# Patient Record
Sex: FEMALE | Race: WHITE | NOT HISPANIC OR LATINO | Employment: OTHER | URBAN - METROPOLITAN AREA
[De-identification: names, ages, dates, MRNs, and addresses within clinical notes are randomized per-mention and may not be internally consistent; named-entity substitution may affect disease eponyms.]

---

## 2023-10-05 ENCOUNTER — OFFICE VISIT (OUTPATIENT)
Dept: POST ACUTE CARE | Facility: EXTERNAL LOCATION | Age: 43
End: 2023-10-05
Payer: MEDICARE

## 2023-10-05 DIAGNOSIS — G80.0 CP (CEREBRAL PALSY), SPASTIC, QUADRIPLEGIC (MULTI): Primary | ICD-10-CM

## 2023-10-05 PROCEDURE — 99308 SBSQ NF CARE LOW MDM 20: CPT | Performed by: ORTHOPAEDIC SURGERY

## 2023-10-05 NOTE — PROGRESS NOTES
Kelsea is a 43 year old female with a history of spastic quadriplegia, scoliosis, bilateral hip dislocations who presents today for evaluation. She uses a new bean bag positioner which has helped her maintain her upright. She is not experiencing pain at this time and looks pretty good in her chair.  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  She also recently had a forearm fracture.  As of now, she is doing well and does not have any significant orthopaedic issues.    No past medical history on file.  No past surgical history on file.  No current outpatient medications on file.  Not on File  No family history on file.  Social History     Socioeconomic History    Marital status: Single     Spouse name: Not on file    Number of children: Not on file    Years of education: Not on file    Highest education level: Not on file   Occupational History    Not on file   Tobacco Use    Smoking status: Not on file    Smokeless tobacco: Not on file   Substance and Sexual Activity    Alcohol use: Not on file    Drug use: Not on file    Sexual activity: Not on file   Other Topics Concern    Not on file   Social History Narrative    Not on file     Social Determinants of Health     Financial Resource Strain: Not on file   Food Insecurity: Not on file   Transportation Needs: Not on file   Physical Activity: Not on file   Stress: Not on file   Social Connections: Not on file   Intimate Partner Violence: Not on file   Housing Stability: Not on file       Review of Systems: 16 systems reviewed with the patient and family.  All systems were negative except for the pertinent positives noted in the history above.    Physical Exam:  Gen: Microcephalic, in wheelchair, NAD  General: Well developed, well nourished female in no acute distress.  Skin: The skin is intact with no evidence of abrasions, bruises, or swelling.  Vascular: There are 2+ pulses in both upper extremities and brisk capillary  refill.  Neuro: The light touch sensation is grossly intact in both arms. The AIN/PIN/Ulnar nerve function is intact.    MSK: Increased tone in bilateral upper extremities. LUE locked in extension, RUE more mobile. R wrist and forearm with full ROM. Marked L lumbar scoliosis present with L ribs no longer in contact with the ilium secondary to position blocks.     Assesment: Kelsea Guzman is a 43 y.o. female who is doing well following a fracture last year.  She is tight everywhere and has a significant curve in her back.  She might benefit from some additional tone meds, but otherwise has no orthopaedic needs.  I will see has again in 5 years with no Xrays unless she has pain or another issue.

## 2023-10-24 PROBLEM — H16.323: Status: ACTIVE | Noted: 2023-10-24

## 2023-10-24 PROBLEM — G82.50 SPASTIC QUADRIPLEGIA (MULTI): Status: ACTIVE | Noted: 2023-10-24

## 2023-10-24 PROBLEM — S52.209A CLOSED FRACTURE OF SHAFT OF ULNA: Status: ACTIVE | Noted: 2023-10-24

## 2023-10-24 PROBLEM — Z86.69 HISTORY OF KERATITIS: Status: ACTIVE | Noted: 2023-10-24

## 2023-10-24 PROBLEM — S52.231D: Status: ACTIVE | Noted: 2023-10-24

## 2023-10-24 PROBLEM — H17.9 BILATERAL CORNEA SCARS: Status: ACTIVE | Noted: 2023-10-24

## 2023-10-24 PROBLEM — M41.45 NEUROMUSCULAR SCOLIOSIS OF THORACOLUMBAR REGION: Status: ACTIVE | Noted: 2023-10-24

## 2023-10-24 PROBLEM — R22.31 MASS OF RIGHT FOREARM: Status: ACTIVE | Noted: 2023-10-24

## 2023-10-24 RX ORDER — ALBUTEROL SULFATE 90 UG/1
AEROSOL, METERED RESPIRATORY (INHALATION)
COMMUNITY

## 2023-10-24 RX ORDER — MUPIROCIN 20 MG/G
OINTMENT TOPICAL
COMMUNITY
Start: 2019-06-14

## 2023-10-24 RX ORDER — OMEPRAZOLE 20 MG/1
CAPSULE, DELAYED RELEASE ORAL
COMMUNITY
Start: 2023-09-30

## 2023-10-24 RX ORDER — HYPROMELLOSE 0 G/G
GEL OPHTHALMIC
COMMUNITY

## 2023-10-24 RX ORDER — DOXYCYCLINE HYCLATE 100 MG
TABLET ORAL
COMMUNITY
Start: 2023-05-13

## 2023-10-24 RX ORDER — ACETAMINOPHEN 160 MG/5ML
SUSPENSION ORAL
COMMUNITY

## 2023-10-24 RX ORDER — DOXYLAMINE SUCCINATE 25 MG
TABLET ORAL
COMMUNITY

## 2023-10-24 RX ORDER — BISACODYL 5 MG
TABLET, DELAYED RELEASE (ENTERIC COATED) ORAL ONCE
COMMUNITY

## 2023-10-24 RX ORDER — NYSTATIN 100000 U/G
CREAM TOPICAL
COMMUNITY
Start: 2023-05-26

## 2023-10-24 RX ORDER — ACETAMINOPHEN 500 MG
TABLET ORAL
COMMUNITY

## 2023-10-24 RX ORDER — MELATONIN 100 %
POWDER (GRAM) MISCELLANEOUS
COMMUNITY

## 2023-10-24 RX ORDER — CLOTRIMAZOLE 1 %
CREAM (GRAM) TOPICAL
COMMUNITY
Start: 2023-08-25

## 2023-10-24 RX ORDER — BISACODYL 10 MG/1
SUPPOSITORY RECTAL
COMMUNITY

## 2023-10-24 RX ORDER — OSELTAMIVIR PHOSPHATE 30 MG/1
CAPSULE ORAL
COMMUNITY

## 2023-10-24 RX ORDER — MULTIVITAMIN
TABLET ORAL
COMMUNITY

## 2023-10-24 RX ORDER — MULTIVITAMIN/IRON/FOLIC ACID 18MG-0.4MG
TABLET ORAL
COMMUNITY
Start: 2023-08-29

## 2023-10-24 RX ORDER — ALBUTEROL SULFATE 0.63 MG/3ML
SOLUTION RESPIRATORY (INHALATION)
COMMUNITY

## 2023-10-24 RX ORDER — POLYETHYLENE GLYCOL 3350 17 G/17G
POWDER, FOR SOLUTION ORAL
COMMUNITY

## 2023-10-24 RX ORDER — MULTIVIT,CALC,MINS/IRON/FOLIC 9MG-400MCG
TABLET ORAL
COMMUNITY
Start: 2022-11-02

## 2023-10-24 RX ORDER — LANOLIN ALCOHOL/MO/W.PET/CERES
CREAM (GRAM) TOPICAL
COMMUNITY
Start: 2023-09-18

## 2023-10-24 RX ORDER — ACETAMINOPHEN 325 MG/1
TABLET ORAL
COMMUNITY

## 2023-10-24 RX ORDER — NAPROXEN 250 MG/1
TABLET ORAL
COMMUNITY

## 2023-10-24 RX ORDER — SALINE NASAL SPRAY 1.5 OZ
SOLUTION NASAL
COMMUNITY
Start: 2023-03-23

## 2023-10-24 ASSESSMENT — CUP TO DISC RATIO
OS_RATIO: 0.3
OD_RATIO: 0.3

## 2023-10-24 ASSESSMENT — EXTERNAL EXAM - LEFT EYE: OS_EXAM: NORMAL

## 2023-10-24 ASSESSMENT — EXTERNAL EXAM - RIGHT EYE: OD_EXAM: NORMAL

## 2023-10-24 NOTE — PROGRESS NOTES
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 No active inflammation or epithelial defects seen on exam today.  -Per caretakers, patient without redness, not rubbing eyes, does not seem bothered by vision/eyes. Had a couple of episodes of mucus appearing substance on cornea about 2019, but no redness or pain. Seemed to go away on it`s own. No reported issues currently. Advised to call if symptoms worsen.  -Continue artificial tears OU QID and lubricating ointment (genteal gel) QHS. F/u 1 year, but advised to return sooner if any redness, pain, change in vision.

## 2023-10-25 ENCOUNTER — OFFICE VISIT (OUTPATIENT)
Dept: OPHTHALMOLOGY | Facility: CLINIC | Age: 43
End: 2023-10-25
Payer: MEDICARE

## 2023-10-25 DIAGNOSIS — Z86.69 HISTORY OF KERATITIS: ICD-10-CM

## 2023-10-25 DIAGNOSIS — H17.9 CORNEAL SCARS, BOTH EYES: Primary | ICD-10-CM

## 2023-10-25 DIAGNOSIS — G82.50 SPASTIC QUADRIPLEGIA (MULTI): ICD-10-CM

## 2023-10-25 PROCEDURE — 92014 COMPRE OPH EXAM EST PT 1/>: CPT | Performed by: OPHTHALMOLOGY

## 2023-10-25 ASSESSMENT — VISUAL ACUITY: METHOD: SNELLEN - LINEAR

## 2023-10-25 ASSESSMENT — ENCOUNTER SYMPTOMS: EYES NEGATIVE: 1

## 2023-10-25 ASSESSMENT — TONOMETRY
OD_IOP_MMHG: 19
OS_IOP_MMHG: 21
IOP_METHOD: TONOPEN

## 2024-04-03 ENCOUNTER — LAB REQUISITION (OUTPATIENT)
Dept: LAB | Facility: HOSPITAL | Age: 44
End: 2024-04-03
Payer: MEDICARE

## 2024-04-03 DIAGNOSIS — E55.9 VITAMIN D DEFICIENCY, UNSPECIFIED: ICD-10-CM

## 2024-04-03 DIAGNOSIS — D64.9 ANEMIA, UNSPECIFIED: ICD-10-CM

## 2024-04-03 DIAGNOSIS — E78.5 HYPERLIPIDEMIA, UNSPECIFIED: ICD-10-CM

## 2024-04-03 DIAGNOSIS — G80.0 SPASTIC QUADRIPLEGIC CEREBRAL PALSY (MULTI): ICD-10-CM

## 2024-04-03 DIAGNOSIS — K21.9 GASTRO-ESOPHAGEAL REFLUX DISEASE WITHOUT ESOPHAGITIS: ICD-10-CM

## 2024-04-03 DIAGNOSIS — Z79.899 OTHER LONG TERM (CURRENT) DRUG THERAPY: ICD-10-CM

## 2024-04-03 LAB
25(OH)D3 SERPL-MCNC: 43 NG/ML (ref 30–100)
ALBUMIN SERPL BCP-MCNC: 4.1 G/DL (ref 3.4–5)
ALP SERPL-CCNC: 66 U/L (ref 33–110)
ALT SERPL W P-5'-P-CCNC: 20 U/L (ref 7–45)
ANION GAP SERPL CALC-SCNC: 11 MMOL/L (ref 10–20)
AST SERPL W P-5'-P-CCNC: 17 U/L (ref 9–39)
BASOPHILS # BLD AUTO: 0.08 X10*3/UL (ref 0–0.1)
BASOPHILS NFR BLD AUTO: 1.2 %
BILIRUB SERPL-MCNC: 0.2 MG/DL (ref 0–1.2)
BUN SERPL-MCNC: 12 MG/DL (ref 6–23)
CALCIUM SERPL-MCNC: 9 MG/DL (ref 8.6–10.3)
CHLORIDE SERPL-SCNC: 105 MMOL/L (ref 98–107)
CHOLEST SERPL-MCNC: 180 MG/DL (ref 0–199)
CHOLESTEROL/HDL RATIO: 4.3
CO2 SERPL-SCNC: 29 MMOL/L (ref 21–32)
CREAT SERPL-MCNC: 0.47 MG/DL (ref 0.5–1.05)
CRP SERPL-MCNC: 0.38 MG/DL
EGFRCR SERPLBLD CKD-EPI 2021: >90 ML/MIN/1.73M*2
EOSINOPHIL # BLD AUTO: 0.16 X10*3/UL (ref 0–0.7)
EOSINOPHIL NFR BLD AUTO: 2.3 %
ERYTHROCYTE [DISTWIDTH] IN BLOOD BY AUTOMATED COUNT: 13.3 % (ref 11.5–14.5)
FERRITIN SERPL-MCNC: 41 NG/ML (ref 8–150)
GLUCOSE SERPL-MCNC: 77 MG/DL (ref 74–99)
HCT VFR BLD AUTO: 40.6 % (ref 36–46)
HDLC SERPL-MCNC: 41.9 MG/DL
HGB BLD-MCNC: 12.6 G/DL (ref 12–16)
IMM GRANULOCYTES # BLD AUTO: 0.02 X10*3/UL (ref 0–0.7)
IMM GRANULOCYTES NFR BLD AUTO: 0.3 % (ref 0–0.9)
IRON SATN MFR SERPL: 17 % (ref 25–45)
IRON SERPL-MCNC: 56 UG/DL (ref 35–150)
LDLC SERPL CALC-MCNC: 102 MG/DL
LYMPHOCYTES # BLD AUTO: 2.03 X10*3/UL (ref 1.2–4.8)
LYMPHOCYTES NFR BLD AUTO: 29.8 %
MAGNESIUM SERPL-MCNC: 2.11 MG/DL (ref 1.6–2.4)
MCH RBC QN AUTO: 28.3 PG (ref 26–34)
MCHC RBC AUTO-ENTMCNC: 31 G/DL (ref 32–36)
MCV RBC AUTO: 91 FL (ref 80–100)
MONOCYTES # BLD AUTO: 0.39 X10*3/UL (ref 0.1–1)
MONOCYTES NFR BLD AUTO: 5.7 %
NEUTROPHILS # BLD AUTO: 4.13 X10*3/UL (ref 1.2–7.7)
NEUTROPHILS NFR BLD AUTO: 60.7 %
NON HDL CHOLESTEROL: 138 MG/DL (ref 0–149)
NRBC BLD-RTO: 0 /100 WBCS (ref 0–0)
PHOSPHATE SERPL-MCNC: 4.1 MG/DL (ref 2.5–4.9)
PLATELET # BLD AUTO: 424 X10*3/UL (ref 150–450)
POTASSIUM SERPL-SCNC: 4.9 MMOL/L (ref 3.5–5.3)
PROT SERPL-MCNC: 6.8 G/DL (ref 6.4–8.2)
RBC # BLD AUTO: 4.46 X10*6/UL (ref 4–5.2)
SODIUM SERPL-SCNC: 140 MMOL/L (ref 136–145)
TIBC SERPL-MCNC: 331 UG/DL (ref 240–445)
TRANSFERRIN SERPL-MCNC: 239 MG/DL (ref 200–360)
TRIGL SERPL-MCNC: 182 MG/DL (ref 0–149)
UIBC SERPL-MCNC: 275 UG/DL (ref 110–370)
VLDL: 36 MG/DL (ref 0–40)
WBC # BLD AUTO: 6.8 X10*3/UL (ref 4.4–11.3)

## 2024-04-03 PROCEDURE — 85025 COMPLETE CBC W/AUTO DIFF WBC: CPT | Mod: OUT | Performed by: PEDIATRICS

## 2024-04-03 PROCEDURE — 84100 ASSAY OF PHOSPHORUS: CPT | Mod: OUT | Performed by: PEDIATRICS

## 2024-04-03 PROCEDURE — 82728 ASSAY OF FERRITIN: CPT | Mod: OUT | Performed by: PEDIATRICS

## 2024-04-03 PROCEDURE — 82306 VITAMIN D 25 HYDROXY: CPT | Mod: OUT | Performed by: PEDIATRICS

## 2024-04-03 PROCEDURE — 83735 ASSAY OF MAGNESIUM: CPT | Mod: OUT | Performed by: PEDIATRICS

## 2024-04-03 PROCEDURE — 86140 C-REACTIVE PROTEIN: CPT | Mod: OUT | Performed by: PEDIATRICS

## 2024-04-03 PROCEDURE — 83540 ASSAY OF IRON: CPT | Mod: OUT | Performed by: PEDIATRICS

## 2024-04-03 PROCEDURE — 83718 ASSAY OF LIPOPROTEIN: CPT | Mod: OUT | Performed by: PEDIATRICS

## 2024-04-03 PROCEDURE — 80053 COMPREHEN METABOLIC PANEL: CPT | Mod: OUT | Performed by: PEDIATRICS

## 2024-04-03 PROCEDURE — 84466 ASSAY OF TRANSFERRIN: CPT | Mod: OUT,PORLAB | Performed by: PEDIATRICS

## 2024-10-27 ASSESSMENT — EXTERNAL EXAM - LEFT EYE: OS_EXAM: NORMAL

## 2024-10-27 ASSESSMENT — EXTERNAL EXAM - RIGHT EYE: OD_EXAM: NORMAL

## 2024-10-27 ASSESSMENT — CUP TO DISC RATIO
OS_RATIO: 0.3
OD_RATIO: 0.3

## 2024-10-30 ENCOUNTER — APPOINTMENT (OUTPATIENT)
Dept: OPHTHALMOLOGY | Facility: CLINIC | Age: 44
End: 2024-10-30
Payer: MEDICARE

## 2024-10-30 DIAGNOSIS — Z86.69 HISTORY OF KERATITIS: ICD-10-CM

## 2024-10-30 DIAGNOSIS — H17.9 CORNEAL SCARS, BOTH EYES: Primary | ICD-10-CM

## 2024-10-30 DIAGNOSIS — G82.50 SPASTIC QUADRIPLEGIA (MULTI): ICD-10-CM

## 2024-10-30 PROCEDURE — 92014 COMPRE OPH EXAM EST PT 1/>: CPT | Performed by: OPHTHALMOLOGY

## 2024-10-30 ASSESSMENT — VISUAL ACUITY
OD_SC: F+F
OS_SC: F+F
METHOD: SNELLEN - LINEAR

## 2024-10-30 ASSESSMENT — ENCOUNTER SYMPTOMS
HEMATOLOGIC/LYMPHATIC NEGATIVE: 0
NEUROLOGICAL NEGATIVE: 0
CARDIOVASCULAR NEGATIVE: 0
GASTROINTESTINAL NEGATIVE: 0
RESPIRATORY NEGATIVE: 0
CONSTITUTIONAL NEGATIVE: 0
EYES NEGATIVE: 1
ENDOCRINE NEGATIVE: 0
ALLERGIC/IMMUNOLOGIC NEGATIVE: 0
MUSCULOSKELETAL NEGATIVE: 0
PSYCHIATRIC NEGATIVE: 0

## 2024-10-30 ASSESSMENT — TONOMETRY
OD_IOP_MMHG: 20
IOP_METHOD: TONOPEN
OS_IOP_MMHG: 20

## 2024-11-05 ENCOUNTER — LAB REQUISITION (OUTPATIENT)
Dept: LAB | Facility: HOSPITAL | Age: 44
End: 2024-11-05
Payer: MEDICARE

## 2024-11-05 DIAGNOSIS — R41.82 ALTERED MENTAL STATUS, UNSPECIFIED: ICD-10-CM

## 2024-11-05 DIAGNOSIS — R30.0 DYSURIA: ICD-10-CM

## 2024-11-05 LAB
ALBUMIN SERPL BCP-MCNC: 3.8 G/DL (ref 3.4–5)
ALP SERPL-CCNC: 55 U/L (ref 33–110)
ALT SERPL W P-5'-P-CCNC: 10 U/L (ref 7–45)
ANION GAP SERPL CALC-SCNC: 13 MMOL/L (ref 10–20)
APPEARANCE UR: ABNORMAL
AST SERPL W P-5'-P-CCNC: 16 U/L (ref 9–39)
BACTERIA #/AREA URNS AUTO: ABNORMAL /HPF
BASOPHILS # BLD AUTO: 0.06 X10*3/UL (ref 0–0.1)
BASOPHILS NFR BLD AUTO: 1.1 %
BILIRUB SERPL-MCNC: 0.3 MG/DL (ref 0–1.2)
BILIRUB UR STRIP.AUTO-MCNC: NEGATIVE MG/DL
BUN SERPL-MCNC: 10 MG/DL (ref 6–23)
CALCIUM SERPL-MCNC: 8.7 MG/DL (ref 8.6–10.3)
CHLORIDE SERPL-SCNC: 100 MMOL/L (ref 98–107)
CO2 SERPL-SCNC: 23 MMOL/L (ref 21–32)
COLOR UR: YELLOW
CREAT SERPL-MCNC: 0.31 MG/DL (ref 0.5–1.05)
CRP SERPL-MCNC: 0.15 MG/DL
EGFRCR SERPLBLD CKD-EPI 2021: >90 ML/MIN/1.73M*2
EOSINOPHIL # BLD AUTO: 0.12 X10*3/UL (ref 0–0.7)
EOSINOPHIL NFR BLD AUTO: 2.2 %
ERYTHROCYTE [DISTWIDTH] IN BLOOD BY AUTOMATED COUNT: 13.2 % (ref 11.5–14.5)
GLUCOSE SERPL-MCNC: 95 MG/DL (ref 74–99)
GLUCOSE UR STRIP.AUTO-MCNC: NORMAL MG/DL
HCT VFR BLD AUTO: 40.2 % (ref 36–46)
HGB BLD-MCNC: 12.9 G/DL (ref 12–16)
IMM GRANULOCYTES # BLD AUTO: 0.02 X10*3/UL (ref 0–0.7)
IMM GRANULOCYTES NFR BLD AUTO: 0.4 % (ref 0–0.9)
KETONES UR STRIP.AUTO-MCNC: NEGATIVE MG/DL
LEUKOCYTE ESTERASE UR QL STRIP.AUTO: ABNORMAL
LYMPHOCYTES # BLD AUTO: 1.65 X10*3/UL (ref 1.2–4.8)
LYMPHOCYTES NFR BLD AUTO: 29.8 %
MCH RBC QN AUTO: 28.5 PG (ref 26–34)
MCHC RBC AUTO-ENTMCNC: 32.1 G/DL (ref 32–36)
MCV RBC AUTO: 89 FL (ref 80–100)
MONOCYTES # BLD AUTO: 0.3 X10*3/UL (ref 0.1–1)
MONOCYTES NFR BLD AUTO: 5.4 %
MUCOUS THREADS #/AREA URNS AUTO: ABNORMAL /LPF
NEUTROPHILS # BLD AUTO: 3.38 X10*3/UL (ref 1.2–7.7)
NEUTROPHILS NFR BLD AUTO: 61.1 %
NITRITE UR QL STRIP.AUTO: ABNORMAL
NRBC BLD-RTO: 0 /100 WBCS (ref 0–0)
PH UR STRIP.AUTO: 6 [PH]
PLATELET # BLD AUTO: 359 X10*3/UL (ref 150–450)
POTASSIUM SERPL-SCNC: 4.2 MMOL/L (ref 3.5–5.3)
PROT SERPL-MCNC: 6.7 G/DL (ref 6.4–8.2)
PROT UR STRIP.AUTO-MCNC: NEGATIVE MG/DL
RBC # BLD AUTO: 4.53 X10*6/UL (ref 4–5.2)
RBC # UR STRIP.AUTO: NEGATIVE /UL
RBC #/AREA URNS AUTO: ABNORMAL /HPF
SODIUM SERPL-SCNC: 132 MMOL/L (ref 136–145)
SP GR UR STRIP.AUTO: 1.02
SQUAMOUS #/AREA URNS AUTO: ABNORMAL /HPF
UROBILINOGEN UR STRIP.AUTO-MCNC: NORMAL MG/DL
WBC # BLD AUTO: 5.5 X10*3/UL (ref 4.4–11.3)
WBC #/AREA URNS AUTO: ABNORMAL /HPF
WBC CLUMPS #/AREA URNS AUTO: ABNORMAL /HPF

## 2024-11-05 PROCEDURE — 81001 URINALYSIS AUTO W/SCOPE: CPT | Mod: OUT | Performed by: PEDIATRICS

## 2024-11-05 PROCEDURE — 85025 COMPLETE CBC W/AUTO DIFF WBC: CPT | Mod: OUT | Performed by: PEDIATRICS

## 2024-11-05 PROCEDURE — 87086 URINE CULTURE/COLONY COUNT: CPT | Mod: OUT,PORLAB | Performed by: PEDIATRICS

## 2024-11-05 PROCEDURE — 80053 COMPREHEN METABOLIC PANEL: CPT | Mod: OUT | Performed by: PEDIATRICS

## 2024-11-05 PROCEDURE — 86140 C-REACTIVE PROTEIN: CPT | Mod: OUT | Performed by: PEDIATRICS

## 2024-11-09 LAB — BACTERIA UR CULT: ABNORMAL

## 2024-11-20 ENCOUNTER — LAB REQUISITION (OUTPATIENT)
Dept: LAB | Facility: HOSPITAL | Age: 44
End: 2024-11-20
Payer: MEDICARE

## 2024-11-20 DIAGNOSIS — E78.5 HYPERLIPIDEMIA, UNSPECIFIED: ICD-10-CM

## 2024-11-20 DIAGNOSIS — R41.82 ALTERED MENTAL STATUS, UNSPECIFIED: ICD-10-CM

## 2024-11-20 DIAGNOSIS — D64.9 ANEMIA, UNSPECIFIED: ICD-10-CM

## 2024-11-20 LAB
ANION GAP SERPL CALC-SCNC: 13 MMOL/L (ref 10–20)
BUN SERPL-MCNC: 9 MG/DL (ref 6–23)
CALCIUM SERPL-MCNC: 9.9 MG/DL (ref 8.6–10.3)
CHLORIDE SERPL-SCNC: 101 MMOL/L (ref 98–107)
CO2 SERPL-SCNC: 29 MMOL/L (ref 21–32)
CREAT SERPL-MCNC: 0.45 MG/DL (ref 0.5–1.05)
EGFRCR SERPLBLD CKD-EPI 2021: >90 ML/MIN/1.73M*2
GLUCOSE SERPL-MCNC: 114 MG/DL (ref 74–99)
POTASSIUM SERPL-SCNC: 5.2 MMOL/L (ref 3.5–5.3)
SODIUM SERPL-SCNC: 138 MMOL/L (ref 136–145)

## 2024-11-20 PROCEDURE — 80048 BASIC METABOLIC PNL TOTAL CA: CPT | Mod: OUT | Performed by: PEDIATRICS

## 2024-11-20 PROCEDURE — 36415 COLL VENOUS BLD VENIPUNCTURE: CPT | Mod: OUT | Performed by: PEDIATRICS

## 2025-04-04 ENCOUNTER — LAB REQUISITION (OUTPATIENT)
Dept: LAB | Facility: HOSPITAL | Age: 45
End: 2025-04-04
Payer: MEDICARE

## 2025-04-04 DIAGNOSIS — R30.0 DYSURIA: ICD-10-CM

## 2025-04-04 LAB
APPEARANCE UR: ABNORMAL
BACTERIA #/AREA URNS AUTO: ABNORMAL /HPF
BILIRUB UR STRIP.AUTO-MCNC: NEGATIVE MG/DL
COLOR UR: ABNORMAL
GLUCOSE UR STRIP.AUTO-MCNC: NORMAL MG/DL
KETONES UR STRIP.AUTO-MCNC: NEGATIVE MG/DL
LEUKOCYTE ESTERASE UR QL STRIP.AUTO: ABNORMAL
NITRITE UR QL STRIP.AUTO: ABNORMAL
PH UR STRIP.AUTO: 7.5 [PH]
PROT UR STRIP.AUTO-MCNC: NEGATIVE MG/DL
RBC # UR STRIP.AUTO: NEGATIVE MG/DL
RBC #/AREA URNS AUTO: ABNORMAL /HPF
SP GR UR STRIP.AUTO: 1.01
SQUAMOUS #/AREA URNS AUTO: ABNORMAL /HPF
UROBILINOGEN UR STRIP.AUTO-MCNC: NORMAL MG/DL
WBC #/AREA URNS AUTO: ABNORMAL /HPF
WBC CLUMPS #/AREA URNS AUTO: ABNORMAL /HPF
YEAST BUDDING #/AREA UR COMP ASSIST: PRESENT /HPF

## 2025-04-04 PROCEDURE — 81001 URINALYSIS AUTO W/SCOPE: CPT | Mod: OUT | Performed by: PEDIATRICS

## 2025-04-04 PROCEDURE — 87086 URINE CULTURE/COLONY COUNT: CPT | Mod: OUT,PORLAB | Performed by: PEDIATRICS

## 2025-04-06 LAB — BACTERIA UR CULT: ABNORMAL

## 2025-04-07 LAB — BACTERIA UR CULT: ABNORMAL

## 2025-05-08 ENCOUNTER — APPOINTMENT (OUTPATIENT)
Dept: OPHTHALMOLOGY | Age: 45
End: 2025-05-08
Payer: MEDICARE

## 2025-05-20 ENCOUNTER — APPOINTMENT (OUTPATIENT)
Dept: OPHTHALMOLOGY | Age: 45
End: 2025-05-20
Payer: MEDICARE

## 2025-05-28 ENCOUNTER — LAB REQUISITION (OUTPATIENT)
Dept: LAB | Facility: HOSPITAL | Age: 45
End: 2025-05-28
Payer: MEDICARE

## 2025-05-28 DIAGNOSIS — E55.9 VITAMIN D DEFICIENCY, UNSPECIFIED: ICD-10-CM

## 2025-05-28 DIAGNOSIS — G80.0 SPASTIC QUADRIPLEGIC CEREBRAL PALSY (MULTI): ICD-10-CM

## 2025-05-28 DIAGNOSIS — D64.9 ANEMIA, UNSPECIFIED: ICD-10-CM

## 2025-05-28 LAB
25(OH)D3 SERPL-MCNC: 47 NG/ML (ref 30–100)
ALBUMIN SERPL BCP-MCNC: 4.2 G/DL (ref 3.4–5)
ALP SERPL-CCNC: 70 U/L (ref 33–110)
ALT SERPL W P-5'-P-CCNC: 19 U/L (ref 7–45)
ANION GAP SERPL CALC-SCNC: 10 MMOL/L (ref 10–20)
AST SERPL W P-5'-P-CCNC: 15 U/L (ref 9–39)
BASOPHILS # BLD AUTO: 0.06 X10*3/UL (ref 0–0.1)
BASOPHILS NFR BLD AUTO: 1.1 %
BILIRUB SERPL-MCNC: 0.3 MG/DL (ref 0–1.2)
BUN SERPL-MCNC: 10 MG/DL (ref 6–23)
CALCIUM SERPL-MCNC: 9.6 MG/DL (ref 8.6–10.3)
CHLORIDE SERPL-SCNC: 104 MMOL/L (ref 98–107)
CO2 SERPL-SCNC: 31 MMOL/L (ref 21–32)
CREAT SERPL-MCNC: 0.47 MG/DL (ref 0.5–1.05)
CRP SERPL-MCNC: 0.63 MG/DL
EGFRCR SERPLBLD CKD-EPI 2021: >90 ML/MIN/1.73M*2
EOSINOPHIL # BLD AUTO: 0.12 X10*3/UL (ref 0–0.7)
EOSINOPHIL NFR BLD AUTO: 2.2 %
ERYTHROCYTE [DISTWIDTH] IN BLOOD BY AUTOMATED COUNT: 14 % (ref 11.5–14.5)
FERRITIN SERPL-MCNC: 57 NG/ML (ref 8–150)
GLUCOSE SERPL-MCNC: 82 MG/DL (ref 74–99)
HCT VFR BLD AUTO: 40.9 % (ref 36–46)
HGB BLD-MCNC: 12.8 G/DL (ref 12–16)
IMM GRANULOCYTES # BLD AUTO: 0.01 X10*3/UL (ref 0–0.7)
IMM GRANULOCYTES NFR BLD AUTO: 0.2 % (ref 0–0.9)
IRON SATN MFR SERPL: 28 % (ref 25–45)
IRON SERPL-MCNC: 94 UG/DL (ref 35–150)
LYMPHOCYTES # BLD AUTO: 1.77 X10*3/UL (ref 1.2–4.8)
LYMPHOCYTES NFR BLD AUTO: 32.1 %
MAGNESIUM SERPL-MCNC: 2.07 MG/DL (ref 1.6–2.4)
MCH RBC QN AUTO: 27.8 PG (ref 26–34)
MCHC RBC AUTO-ENTMCNC: 31.3 G/DL (ref 32–36)
MCV RBC AUTO: 89 FL (ref 80–100)
MONOCYTES # BLD AUTO: 0.34 X10*3/UL (ref 0.1–1)
MONOCYTES NFR BLD AUTO: 6.2 %
NEUTROPHILS # BLD AUTO: 3.21 X10*3/UL (ref 1.2–7.7)
NEUTROPHILS NFR BLD AUTO: 58.2 %
NRBC BLD-RTO: 0 /100 WBCS (ref 0–0)
PHOSPHATE SERPL-MCNC: 4.5 MG/DL (ref 2.5–4.9)
PLATELET # BLD AUTO: 416 X10*3/UL (ref 150–450)
POTASSIUM SERPL-SCNC: 5 MMOL/L (ref 3.5–5.3)
PROT SERPL-MCNC: 7.1 G/DL (ref 6.4–8.2)
RBC # BLD AUTO: 4.6 X10*6/UL (ref 4–5.2)
SODIUM SERPL-SCNC: 140 MMOL/L (ref 136–145)
TIBC SERPL-MCNC: 337 UG/DL (ref 240–445)
UIBC SERPL-MCNC: 243 UG/DL (ref 110–370)
WBC # BLD AUTO: 5.5 X10*3/UL (ref 4.4–11.3)

## 2025-05-28 PROCEDURE — 83540 ASSAY OF IRON: CPT | Mod: OUT | Performed by: PEDIATRICS

## 2025-05-28 PROCEDURE — 83735 ASSAY OF MAGNESIUM: CPT | Mod: OUT | Performed by: PEDIATRICS

## 2025-05-28 PROCEDURE — 86140 C-REACTIVE PROTEIN: CPT | Mod: OUT | Performed by: PEDIATRICS

## 2025-05-28 PROCEDURE — 82728 ASSAY OF FERRITIN: CPT | Mod: OUT | Performed by: PEDIATRICS

## 2025-05-28 PROCEDURE — 84100 ASSAY OF PHOSPHORUS: CPT | Mod: OUT | Performed by: PEDIATRICS

## 2025-05-28 PROCEDURE — 85025 COMPLETE CBC W/AUTO DIFF WBC: CPT | Mod: OUT | Performed by: PEDIATRICS

## 2025-05-28 PROCEDURE — 80053 COMPREHEN METABOLIC PANEL: CPT | Mod: OUT | Performed by: PEDIATRICS

## 2025-05-28 PROCEDURE — 36415 COLL VENOUS BLD VENIPUNCTURE: CPT | Mod: OUT | Performed by: PEDIATRICS

## 2025-05-28 PROCEDURE — 82306 VITAMIN D 25 HYDROXY: CPT | Mod: OUT,PORLAB | Performed by: PEDIATRICS

## 2025-06-11 ENCOUNTER — LAB REQUISITION (OUTPATIENT)
Dept: LAB | Facility: HOSPITAL | Age: 45
End: 2025-06-11
Payer: MEDICARE

## 2025-06-11 DIAGNOSIS — R05.9 COUGH, UNSPECIFIED: ICD-10-CM

## 2025-06-11 LAB — RHINOVIRUS RNA UPPER RESP QL NAA+PROBE: DETECTED

## 2025-06-11 PROCEDURE — 87798 DETECT AGENT NOS DNA AMP: CPT | Mod: OUT,PORLAB | Performed by: PEDIATRICS

## 2025-06-26 ENCOUNTER — APPOINTMENT (OUTPATIENT)
Dept: OPHTHALMOLOGY | Facility: CLINIC | Age: 45
End: 2025-06-26
Payer: MEDICARE

## 2025-07-07 ENCOUNTER — APPOINTMENT (OUTPATIENT)
Dept: OPHTHALMOLOGY | Age: 45
End: 2025-07-07
Payer: MEDICARE

## 2025-07-07 DIAGNOSIS — G82.50 SPASTIC QUADRIPLEGIA (MULTI): ICD-10-CM

## 2025-07-07 DIAGNOSIS — H17.9 CORNEAL SCARS, BOTH EYES: Primary | ICD-10-CM

## 2025-07-07 DIAGNOSIS — Z86.69 HISTORY OF KERATITIS: ICD-10-CM

## 2025-07-07 DIAGNOSIS — H01.9 INFLAMMATION OF EYELID: ICD-10-CM

## 2025-07-07 PROCEDURE — 99213 OFFICE O/P EST LOW 20 MIN: CPT

## 2025-07-07 RX ORDER — DOXYCYCLINE 100 MG/1
100 CAPSULE ORAL 2 TIMES DAILY
Qty: 28 CAPSULE | Refills: 0 | Status: SHIPPED | OUTPATIENT
Start: 2025-07-07 | End: 2025-07-21

## 2025-07-07 RX ORDER — NEOMYCIN SULFATE, POLYMYXIN B SULFATE, AND DEXAMETHASONE 3.5; 10000; 1 MG/G; [USP'U]/G; MG/G
OINTMENT OPHTHALMIC
Qty: 3.5 G | Refills: 0 | Status: SHIPPED | OUTPATIENT
Start: 2025-07-07

## 2025-07-07 ASSESSMENT — ENCOUNTER SYMPTOMS
MUSCULOSKELETAL NEGATIVE: 0
EYES NEGATIVE: 0
GASTROINTESTINAL NEGATIVE: 0
HEMATOLOGIC/LYMPHATIC NEGATIVE: 0
PSYCHIATRIC NEGATIVE: 0
RESPIRATORY NEGATIVE: 0
ENDOCRINE NEGATIVE: 0
CONSTITUTIONAL NEGATIVE: 0
CARDIOVASCULAR NEGATIVE: 0
ALLERGIC/IMMUNOLOGIC NEGATIVE: 0
NEUROLOGICAL NEGATIVE: 0

## 2025-07-07 ASSESSMENT — TONOMETRY
OD_IOP_MMHG: 22
IOP_METHOD: TONOPEN
OS_IOP_MMHG: 21

## 2025-07-07 ASSESSMENT — EXTERNAL EXAM - RIGHT EYE: OD_EXAM: NORMAL

## 2025-07-07 ASSESSMENT — EXTERNAL EXAM - LEFT EYE: OS_EXAM: NORMAL

## 2025-07-07 ASSESSMENT — VISUAL ACUITY
OS_SC: FIX AND FOLLOW
METHOD: SNELLEN - LINEAR
OD_SC: FIX AND FOLLOW

## 2025-07-07 NOTE — PROGRESS NOTES
45yoF patient of Dr. Le with a hx of cerebral palsy, spastic quadraplegia, and corneal scarring here for bilateral lid swelling.   Patient was last seen by Dr. Le in October 2024 with stable exam and no new issues.   According to caretakers, she was seen by [facility] in March with bilateral upper and lower lid swelling.   No recent trauma to the face, bug bites, URIs  Was prescribed erythromycin ointment and maxitrol ointment  Since then, caretakers report eyelids are now at baseline  Currently just on artificial tears QID OU    Assessment and Plan  Mild meibomian gland dysfunction (MGD) and blepharitis  No sign of BUL/BLL edema or erythema today; likely resolved now  EOM full OU; ortho   No abnormal discharge  Recommend continued AT QID OU  Rx sent for prn use should flare up occur  Recommend doxycycline 100mg BID as needed for flare up. Recommend maxitrol ointment BID as needed for flare up. Prescriptions sent when flare up comes.   Consider starting lid scrubs (ocusoft) BID OU, warm compresses BID OU  Continue care with Dr. Le as scheduled. Follow with me as needed.   Return precautions given for recurrence of lid edema, erythema, tenderness, discharge, decreased vision

## 2025-11-05 ENCOUNTER — APPOINTMENT (OUTPATIENT)
Dept: OPHTHALMOLOGY | Age: 45
End: 2025-11-05
Payer: COMMERCIAL

## 2026-07-08 ENCOUNTER — APPOINTMENT (OUTPATIENT)
Dept: OPHTHALMOLOGY | Age: 46
End: 2026-07-08
Payer: MEDICARE